# Patient Record
Sex: FEMALE | NOT HISPANIC OR LATINO | URBAN - METROPOLITAN AREA
[De-identification: names, ages, dates, MRNs, and addresses within clinical notes are randomized per-mention and may not be internally consistent; named-entity substitution may affect disease eponyms.]

---

## 2018-06-21 ENCOUNTER — PROCEDURE (OUTPATIENT)
Dept: URBAN - METROPOLITAN AREA CLINIC 45 | Facility: CLINIC | Age: 73
End: 2018-06-21
Payer: COMMERCIAL

## 2018-06-21 PROCEDURE — 65855 TRABECULOPLASTY LASER SURG: CPT | Performed by: OPHTHALMOLOGY

## 2018-06-21 ASSESSMENT — INTRAOCULAR PRESSURE
OS: 15
OS: 17
OD: 16
OD: 14

## 2018-06-29 ENCOUNTER — POST-OPERATIVE VISIT (OUTPATIENT)
Dept: URBAN - METROPOLITAN AREA CLINIC 45 | Facility: CLINIC | Age: 73
End: 2018-06-29

## 2018-06-29 PROCEDURE — 99024 POSTOP FOLLOW-UP VISIT: CPT | Performed by: OPTOMETRIST

## 2018-06-29 RX ORDER — LATANOPROST 50 UG/ML
0.005 % SOLUTION OPHTHALMIC
Qty: 1 | Refills: 5 | Status: INACTIVE
Start: 2018-06-29 | End: 2018-07-18

## 2018-06-29 RX ORDER — BRIMONIDINE TARTRATE 2 MG/ML
0.2 % SOLUTION/ DROPS OPHTHALMIC
Qty: 1 | Refills: 6 | Status: INACTIVE
Start: 2018-06-29 | End: 2018-07-18

## 2018-06-29 ASSESSMENT — INTRAOCULAR PRESSURE
OS: 15
OD: 15

## 2018-07-18 ENCOUNTER — OFFICE VISIT (OUTPATIENT)
Dept: URBAN - METROPOLITAN AREA CLINIC 45 | Facility: CLINIC | Age: 73
End: 2018-07-18
Payer: COMMERCIAL

## 2018-07-18 PROCEDURE — 92012 INTRM OPH EXAM EST PATIENT: CPT | Performed by: OPTOMETRIST

## 2018-07-18 RX ORDER — LATANOPROST 50 UG/ML
0.005 % SOLUTION OPHTHALMIC
Qty: 1 | Refills: 5 | Status: INACTIVE
Start: 2018-07-18 | End: 2018-12-11

## 2018-07-18 RX ORDER — BRIMONIDINE TARTRATE 2 MG/ML
0.2 % SOLUTION/ DROPS OPHTHALMIC
Qty: 1 | Refills: 6 | Status: INACTIVE
Start: 2018-07-18 | End: 2018-12-11

## 2018-07-18 ASSESSMENT — INTRAOCULAR PRESSURE
OD: 12
OS: 14

## 2018-07-18 NOTE — IMPRESSION/PLAN
Impression: Age-related nuclear cataract, right eye: H25.11. Plan: Will continue to observe condition and or symptoms.

## 2018-07-18 NOTE — IMPRESSION/PLAN
Impression: Primary open-angle glaucoma, bilateral, severe stage: W84.1997. Plan: Continue with Brimonidine bid ou and latanoprost qhs ou.

## 2018-08-16 ENCOUNTER — OFFICE VISIT (OUTPATIENT)
Dept: URBAN - METROPOLITAN AREA CLINIC 45 | Facility: CLINIC | Age: 73
End: 2018-08-16
Payer: COMMERCIAL

## 2018-08-16 DIAGNOSIS — H40.1133 PRIMARY OPEN-ANGLE GLAUCOMA, BILATERAL, SEVERE STAGE: ICD-10-CM

## 2018-08-16 DIAGNOSIS — H26.492 OTHER SECONDARY CATARACT, LEFT EYE: ICD-10-CM

## 2018-08-16 PROCEDURE — 99214 OFFICE O/P EST MOD 30 MIN: CPT | Performed by: OPHTHALMOLOGY

## 2018-08-16 ASSESSMENT — VISUAL ACUITY
OS: 20/150
OD: 20/40

## 2018-08-16 ASSESSMENT — INTRAOCULAR PRESSURE
OD: 13
OS: 14

## 2018-08-16 NOTE — IMPRESSION/PLAN
Impression: Primary open-angle glaucoma, bilateral, severe stage: P40.8191. Plan: Continue with Brimonidine bid ou and latanoprost qhs ou.

## 2018-08-16 NOTE — IMPRESSION/PLAN
Impression: Other secondary cataract, left eye: H26.492. Plan: Discussed risk/benefit/alternative to yag-capsulotomy, including risk of IOL dislocation and retinal detachment, pt wishes to proceed. ok for yag-cap in [place] [address] 25 RL1. BCVA could be limited due to retinal history.

## 2018-08-16 NOTE — IMPRESSION/PLAN
Impression: Age-related nuclear cataract, right eye: H25.11. Plan: Cataracts account for the patient's complaints. No treatment currently recommended. The patient will monitor vision changes and contact us with any decrease in vision.

## 2018-08-27 ENCOUNTER — SURGERY (OUTPATIENT)
Dept: URBAN - METROPOLITAN AREA SURGERY 20 | Facility: SURGERY | Age: 73
End: 2018-08-27
Payer: COMMERCIAL

## 2018-08-27 PROCEDURE — 66821 AFTER CATARACT LASER SURGERY: CPT | Performed by: OPHTHALMOLOGY

## 2018-09-04 ENCOUNTER — POST-OPERATIVE VISIT (OUTPATIENT)
Dept: URBAN - METROPOLITAN AREA CLINIC 45 | Facility: CLINIC | Age: 73
End: 2018-09-04

## 2018-09-04 PROCEDURE — 99024 POSTOP FOLLOW-UP VISIT: CPT | Performed by: OPTOMETRIST

## 2018-09-04 RX ORDER — PREDNISOLONE ACETATE 10 MG/ML
1 % SUSPENSION/ DROPS OPHTHALMIC
Qty: 1 | Refills: 0 | Status: INACTIVE
Start: 2018-09-04 | End: 2018-09-11

## 2018-09-04 ASSESSMENT — INTRAOCULAR PRESSURE
OS: 14
OD: 12

## 2018-09-11 ENCOUNTER — POST-OPERATIVE VISIT (OUTPATIENT)
Dept: URBAN - METROPOLITAN AREA CLINIC 45 | Facility: CLINIC | Age: 73
End: 2018-09-11

## 2018-09-11 PROCEDURE — 99024 POSTOP FOLLOW-UP VISIT: CPT | Performed by: OPTOMETRIST

## 2018-09-11 RX ORDER — PREDNISOLONE ACETATE 10 MG/ML
1 % SUSPENSION/ DROPS OPHTHALMIC
Qty: 1 | Refills: 1 | Status: INACTIVE
Start: 2018-09-11 | End: 2018-09-18

## 2018-09-11 ASSESSMENT — INTRAOCULAR PRESSURE
OD: 11
OS: 14

## 2018-12-11 ENCOUNTER — OFFICE VISIT (OUTPATIENT)
Dept: URBAN - METROPOLITAN AREA CLINIC 45 | Facility: CLINIC | Age: 73
End: 2018-12-11
Payer: COMMERCIAL

## 2018-12-11 DIAGNOSIS — H25.11 AGE-RELATED NUCLEAR CATARACT, RIGHT EYE: ICD-10-CM

## 2018-12-11 PROCEDURE — 99213 OFFICE O/P EST LOW 20 MIN: CPT | Performed by: OPTOMETRIST

## 2018-12-11 RX ORDER — PREDNISOLONE ACETATE 10 MG/ML
1 % SUSPENSION/ DROPS OPHTHALMIC
Qty: 1 | Refills: 0 | Status: INACTIVE
Start: 2018-12-11 | End: 2019-01-09

## 2018-12-11 RX ORDER — LATANOPROST 50 UG/ML
0.005 % SOLUTION OPHTHALMIC
Qty: 1 | Refills: 5 | Status: INACTIVE
Start: 2018-12-11 | End: 2019-04-15

## 2018-12-11 RX ORDER — BRIMONIDINE TARTRATE 2 MG/ML
0.2 % SOLUTION/ DROPS OPHTHALMIC
Qty: 1 | Refills: 6 | Status: INACTIVE
Start: 2018-12-11 | End: 2019-04-15

## 2018-12-11 ASSESSMENT — INTRAOCULAR PRESSURE
OS: 14
OD: 11

## 2018-12-11 NOTE — IMPRESSION/PLAN
Impression: Primary iridocyclitis, left eye: H20.012.  Plan: PRED ACETATE QID OS X 1 WEEK THEN TID X 1 WEEK THEN BID X 1 WEEK  THEN Q DY X 1 WK

## 2018-12-11 NOTE — IMPRESSION/PLAN
Impression: Primary open-angle glaucoma, bilateral, severe stage: G18.2358.  Plan: LATANOPROST Q HS OU, BRIMONIDINE BID OU

## 2019-04-15 ENCOUNTER — OFFICE VISIT (OUTPATIENT)
Dept: URBAN - METROPOLITAN AREA CLINIC 45 | Facility: CLINIC | Age: 74
End: 2019-04-15
Payer: COMMERCIAL

## 2019-04-15 DIAGNOSIS — H04.123 DRY EYE SYNDROME OF BILATERAL LACRIMAL GLANDS: ICD-10-CM

## 2019-04-15 PROCEDURE — 92083 EXTENDED VISUAL FIELD XM: CPT | Performed by: OPTOMETRIST

## 2019-04-15 PROCEDURE — 99213 OFFICE O/P EST LOW 20 MIN: CPT | Performed by: OPTOMETRIST

## 2019-04-15 RX ORDER — DUREZOL 0.5 MG/ML
0.05 % EMULSION OPHTHALMIC
Qty: 1 | Refills: 0 | Status: INACTIVE
Start: 2019-04-15 | End: 2019-04-30

## 2019-04-15 RX ORDER — BRIMONIDINE TARTRATE 2 MG/ML
0.2 % SOLUTION/ DROPS OPHTHALMIC
Qty: 1 | Refills: 6 | Status: INACTIVE
Start: 2019-04-15 | End: 2019-06-07

## 2019-04-15 RX ORDER — LATANOPROST 50 UG/ML
0.005 % SOLUTION OPHTHALMIC
Qty: 1 | Refills: 5 | Status: INACTIVE
Start: 2019-04-15 | End: 2019-06-07

## 2019-04-15 ASSESSMENT — INTRAOCULAR PRESSURE
OS: 14
OD: 12

## 2019-04-15 NOTE — IMPRESSION/PLAN
Impression: Primary open-angle glaucoma, bilateral, severe stage: P32.9976. Plan: VF done today, slightly worse.  Pt to continue brimonidine bid ou and Latanoprost qhs ou

## 2019-04-15 NOTE — IMPRESSION/PLAN
Impression: Primary iridocyclitis, left eye: H20.012. Plan: Pt to start Durezol 1 gtt qid os. Educated patient on risks of non-compliance, side effects, benefits and anticipated results, pt understands.

## 2019-04-30 ENCOUNTER — OFFICE VISIT (OUTPATIENT)
Dept: URBAN - METROPOLITAN AREA CLINIC 45 | Facility: CLINIC | Age: 74
End: 2019-04-30
Payer: COMMERCIAL

## 2019-04-30 PROCEDURE — 99213 OFFICE O/P EST LOW 20 MIN: CPT | Performed by: OPTOMETRIST

## 2019-04-30 PROCEDURE — 92133 CPTRZD OPH DX IMG PST SGM ON: CPT | Performed by: OPTOMETRIST

## 2019-04-30 RX ORDER — PREDNISOLONE ACETATE 10 MG/ML
1 % SUSPENSION/ DROPS OPHTHALMIC
Qty: 1 | Refills: 1 | Status: INACTIVE
Start: 2019-04-30 | End: 2019-05-29

## 2019-04-30 ASSESSMENT — INTRAOCULAR PRESSURE
OS: 12
OD: 11

## 2019-04-30 NOTE — IMPRESSION/PLAN
Impression: Primary iridocyclitis, left eye: H20.012. Plan: pt to start pred-acetate 1 gtt qid os. Educated patient on risks of non-compliance, side effects, benefits and anticipated results, pt understands.

## 2019-04-30 NOTE — IMPRESSION/PLAN
Impression: Primary open-angle glaucoma, bilateral, severe stage: C96.7157.  Plan: brimonidine bid ou and latanoprost qhs ou

## 2019-06-07 ENCOUNTER — OFFICE VISIT (OUTPATIENT)
Dept: URBAN - METROPOLITAN AREA CLINIC 45 | Facility: CLINIC | Age: 74
End: 2019-06-07
Payer: COMMERCIAL

## 2019-06-07 DIAGNOSIS — H01.8 OTHER SPECIFIED INFLAMMATION OF EYELID: ICD-10-CM

## 2019-06-07 PROCEDURE — 99213 OFFICE O/P EST LOW 20 MIN: CPT | Performed by: OPTOMETRIST

## 2019-06-07 RX ORDER — BRIMONIDINE TARTRATE 2 MG/ML
0.2 % SOLUTION/ DROPS OPHTHALMIC
Qty: 1 | Refills: 6 | Status: INACTIVE
Start: 2019-06-07 | End: 2019-07-19

## 2019-06-07 RX ORDER — LATANOPROST 50 UG/ML
0.005 % SOLUTION OPHTHALMIC
Qty: 1 | Refills: 5 | Status: INACTIVE
Start: 2019-06-07 | End: 2019-07-19

## 2019-06-07 ASSESSMENT — INTRAOCULAR PRESSURE
OS: 14
OD: 11

## 2019-06-07 NOTE — IMPRESSION/PLAN
Impression: Primary open-angle glaucoma, bilateral, severe stage: M67.2428.  Plan: brimonidine bid ou and latanoprost qhs ou

## 2019-06-07 NOTE — IMPRESSION/PLAN
Impression: Primary iridocyclitis, left eye: H20.012.  Plan: Taper pred-acetate 1 gtt tid x 1 wk then bid x 1 wk then qd x until the bottle is empty

## 2019-07-19 ENCOUNTER — OFFICE VISIT (OUTPATIENT)
Dept: URBAN - METROPOLITAN AREA CLINIC 45 | Facility: CLINIC | Age: 74
End: 2019-07-19
Payer: COMMERCIAL

## 2019-07-19 PROCEDURE — 99213 OFFICE O/P EST LOW 20 MIN: CPT | Performed by: OPTOMETRIST

## 2019-07-19 RX ORDER — LATANOPROST 50 UG/ML
0.005 % SOLUTION OPHTHALMIC
Qty: 7 | Refills: 8 | Status: INACTIVE
Start: 2019-07-19 | End: 2019-09-09

## 2019-07-19 RX ORDER — BRIMONIDINE TARTRATE 2 MG/ML
0.2 % SOLUTION/ DROPS OPHTHALMIC
Qty: 15 | Refills: 8 | Status: INACTIVE
Start: 2019-07-19 | End: 2019-09-09

## 2019-07-19 ASSESSMENT — INTRAOCULAR PRESSURE
OD: 11
OS: 13

## 2019-07-19 NOTE — IMPRESSION/PLAN
Impression: Primary open-angle glaucoma, bilateral, severe stage: E87.0688. Plan: [place], pt to take records.  COntinue with Brimonidine bid ou and Latanoprost qhs ou

## 2019-09-09 ENCOUNTER — OFFICE VISIT (OUTPATIENT)
Dept: URBAN - METROPOLITAN AREA CLINIC 45 | Facility: CLINIC | Age: 74
End: 2019-09-09
Payer: COMMERCIAL

## 2019-09-09 DIAGNOSIS — H25.811 COMBINED FORMS OF AGE-RELATED CATARACT, RIGHT EYE: ICD-10-CM

## 2019-09-09 DIAGNOSIS — E11.9 TYPE 2 DIABETES MELLITUS W/O COMPLICATION: Primary | ICD-10-CM

## 2019-09-09 PROCEDURE — 92014 COMPRE OPH EXAM EST PT 1/>: CPT | Performed by: OPTOMETRIST

## 2019-09-09 RX ORDER — GLYCERIN/PROPYLENE GLYCOL 0.3%-1%
DROPS OPHTHALMIC (EYE)
Qty: 1 | Refills: 10 | Status: ACTIVE
Start: 2019-09-09

## 2019-09-09 RX ORDER — BRIMONIDINE TARTRATE 2 MG/ML
0.2 % SOLUTION/ DROPS OPHTHALMIC
Qty: 15 | Refills: 8 | Status: INACTIVE
Start: 2019-09-09 | End: 2019-10-08

## 2019-09-09 RX ORDER — LATANOPROST 50 UG/ML
0.005 % SOLUTION OPHTHALMIC
Qty: 7 | Refills: 8 | Status: INACTIVE
Start: 2019-09-09 | End: 2019-10-08

## 2019-09-09 ASSESSMENT — INTRAOCULAR PRESSURE
OD: 18
OS: 20

## 2019-09-09 ASSESSMENT — KERATOMETRY
OS: 46.38
OD: 46.13

## 2019-09-09 ASSESSMENT — VISUAL ACUITY: OD: 20/70

## 2019-09-09 NOTE — IMPRESSION/PLAN
Impression: Type 2 diabetes mellitus w/o complication: K55.6. Plan: Diabetes type II: no background retinopathy, no signs of neovascularization noted. Discussed ocular and systemic benefits of blood sugar control.

## 2019-09-09 NOTE — IMPRESSION/PLAN
Impression: Combined forms of age-related cataract, right eye: H25.811. Plan: visually significant cataracts Od, schedule cataract extraction OD . Risk level 2. Discussed risk, benefits and alternatives. Discussed iol options.

## 2019-09-09 NOTE — IMPRESSION/PLAN
Impression: Primary open-angle glaucoma, bilateral, severe stage: L89.3288.  Plan: iop elevated today, increase Brimonidine tid ou, continue Latanoprost q hs ou, consult with Dr. Brett Marion

## 2019-09-09 NOTE — IMPRESSION/PLAN
Impression: Combined forms of age-related cataract, right eye: H25.811. Plan: Patient instructed to use artificial tears as needed.

## 2019-09-17 ENCOUNTER — PRE-OPERATIVE VISIT (OUTPATIENT)
Dept: URBAN - METROPOLITAN AREA CLINIC 45 | Facility: CLINIC | Age: 74
End: 2019-09-17
Payer: COMMERCIAL

## 2019-09-17 PROCEDURE — 76519 ECHO EXAM OF EYE: CPT | Performed by: OPHTHALMOLOGY

## 2019-09-17 ASSESSMENT — PACHYMETRY
OS: 22.81
OS: 4.45
OD: 2.82
OD: 22.56

## 2019-09-24 ENCOUNTER — OFFICE VISIT (OUTPATIENT)
Dept: URBAN - METROPOLITAN AREA CLINIC 45 | Facility: CLINIC | Age: 74
End: 2019-09-24
Payer: COMMERCIAL

## 2019-09-24 PROCEDURE — 92136 OPHTHALMIC BIOMETRY: CPT | Performed by: OPHTHALMOLOGY

## 2019-09-24 PROCEDURE — 99214 OFFICE O/P EST MOD 30 MIN: CPT | Performed by: OPHTHALMOLOGY

## 2019-09-24 ASSESSMENT — INTRAOCULAR PRESSURE
OS: 20
OD: 14

## 2019-09-24 NOTE — IMPRESSION/PLAN
Impression: Combined forms of age-related cataract, right eye: H25.811. Plan: OK for ce/iol OD with Omni in [place] [address] 25, RL2. Distance target. Recommend Pred/Nep combined drops, add Ofloxacin. Discussed diagnosis of cataracts. Discussed BCVA due to Retina and glaucoma. Cataracts are limiting vision. Discussed risks, benefits and alternatives to surgery including but not limited to: bleeding, infection, risk of vision loss, loss of the eye, need for other surgery. Patient voiced understanding and wishes to proceed.

## 2019-09-26 RX ORDER — OFLOXACIN 3 MG/ML
0.3 % SOLUTION/ DROPS OPHTHALMIC
Qty: 5 | Refills: 1 | Status: INACTIVE
Start: 2019-09-26 | End: 2019-10-08

## 2019-09-26 NOTE — IMPRESSION/PLAN
Impression: Primary open-angle glaucoma, bilateral, severe stage: A90.1523. /601, 4/19 OCT 73[birth year] 9/10 Plan: Discussed diagnosis with patient. Discussed treatment options. Recommend patient continue Latanoprost QHS OU and Brimonidine TID OU. Recommend Omni OD (if unable due to insurance coverage, second choice iStent). Discussed glaucoma and cataracts. Discussed risks, benefits and alternatives to MIGS procedure. Pt voiced understanding and desires to proceed.

## 2019-10-01 ENCOUNTER — SURGERY (OUTPATIENT)
Dept: URBAN - METROPOLITAN AREA SURGERY 20 | Facility: SURGERY | Age: 74
End: 2019-10-01
Payer: COMMERCIAL

## 2019-10-01 PROCEDURE — 66174 TRLUML DIL AQ O/F CAN W/O ST: CPT | Performed by: OPHTHALMOLOGY

## 2019-10-02 ENCOUNTER — POST-OPERATIVE VISIT (OUTPATIENT)
Dept: URBAN - METROPOLITAN AREA CLINIC 45 | Facility: CLINIC | Age: 74
End: 2019-10-02

## 2019-10-02 PROCEDURE — 99024 POSTOP FOLLOW-UP VISIT: CPT | Performed by: OPTOMETRIST

## 2019-10-02 ASSESSMENT — INTRAOCULAR PRESSURE
OS: 20
OD: 17

## 2019-10-08 ENCOUNTER — POST-OPERATIVE VISIT (OUTPATIENT)
Dept: URBAN - METROPOLITAN AREA CLINIC 45 | Facility: CLINIC | Age: 74
End: 2019-10-08

## 2019-10-08 PROCEDURE — 99024 POSTOP FOLLOW-UP VISIT: CPT | Performed by: OPTOMETRIST

## 2019-10-08 RX ORDER — LATANOPROST 50 UG/ML
0.005 % SOLUTION OPHTHALMIC
Qty: 7 | Refills: 8 | Status: INACTIVE
Start: 2019-10-08 | End: 2019-10-29

## 2019-10-08 RX ORDER — BRIMONIDINE TARTRATE 2 MG/ML
0.2 % SOLUTION/ DROPS OPHTHALMIC
Qty: 15 | Refills: 8 | Status: INACTIVE
Start: 2019-10-08 | End: 2019-10-29

## 2019-10-08 ASSESSMENT — INTRAOCULAR PRESSURE
OD: 16
OS: 19

## 2019-10-17 ENCOUNTER — POST-OPERATIVE VISIT (OUTPATIENT)
Dept: URBAN - METROPOLITAN AREA CLINIC 45 | Facility: CLINIC | Age: 74
End: 2019-10-17

## 2019-10-17 ASSESSMENT — INTRAOCULAR PRESSURE
OD: 14
OS: 14

## 2019-10-17 ASSESSMENT — VISUAL ACUITY
OS: 20/30
OD: 20/30

## 2019-10-29 ENCOUNTER — POST-OPERATIVE VISIT (OUTPATIENT)
Dept: URBAN - METROPOLITAN AREA CLINIC 45 | Facility: CLINIC | Age: 74
End: 2019-10-29

## 2019-10-29 DIAGNOSIS — H52.4 PRESBYOPIA: ICD-10-CM

## 2019-10-29 DIAGNOSIS — Z09 ENCNTR FOR F/U EXAM AFT TRTMT FOR COND OTH THAN MALIG NEOPLM: Primary | ICD-10-CM

## 2019-10-29 PROCEDURE — 99024 POSTOP FOLLOW-UP VISIT: CPT | Performed by: OPTOMETRIST

## 2019-10-29 RX ORDER — BRIMONIDINE TARTRATE 2 MG/ML
0.2 % SOLUTION/ DROPS OPHTHALMIC
Qty: 15 | Refills: 8 | Status: INACTIVE
Start: 2019-10-29 | End: 2019-12-09

## 2019-10-29 RX ORDER — LATANOPROST 50 UG/ML
0.005 % SOLUTION OPHTHALMIC
Qty: 7 | Refills: 8 | Status: INACTIVE
Start: 2019-10-29 | End: 2019-12-09

## 2019-10-29 ASSESSMENT — VISUAL ACUITY
OS: 20/20
OD: 20/25

## 2019-10-29 ASSESSMENT — INTRAOCULAR PRESSURE
OD: 15
OS: 15

## 2019-11-08 ENCOUNTER — OFFICE VISIT (OUTPATIENT)
Dept: URBAN - METROPOLITAN AREA CLINIC 45 | Facility: CLINIC | Age: 74
End: 2019-11-08
Payer: COMMERCIAL

## 2019-11-08 PROCEDURE — 92012 INTRM OPH EXAM EST PATIENT: CPT | Performed by: OPTOMETRIST

## 2019-11-08 RX ORDER — PREDNISOLONE ACETATE 10 MG/ML
1 % SUSPENSION/ DROPS OPHTHALMIC
Qty: 1 | Refills: 0 | Status: INACTIVE
Start: 2019-11-08 | End: 2019-12-07

## 2019-11-08 ASSESSMENT — INTRAOCULAR PRESSURE
OD: 9
OS: 10

## 2019-11-08 NOTE — IMPRESSION/PLAN
Impression: Primary open-angle glaucoma, bilateral, severe stage: D20.8912. Plan: Continue brimonidine 1 gtt TID OU, Latanoprost 1 gtt QHS OU.

## 2019-11-08 NOTE — IMPRESSION/PLAN
Impression: Primary iridocyclitis, left eye: H20.012. Plan: Pred-acetate 1 gtt qid, os. warm compress. keep appointment scheduled.

## 2019-12-09 ENCOUNTER — OFFICE VISIT (OUTPATIENT)
Dept: URBAN - METROPOLITAN AREA CLINIC 45 | Facility: CLINIC | Age: 74
End: 2019-12-09
Payer: COMMERCIAL

## 2019-12-09 DIAGNOSIS — H20.012 PRIMARY IRIDOCYCLITIS, LEFT EYE: ICD-10-CM

## 2019-12-09 PROCEDURE — 99213 OFFICE O/P EST LOW 20 MIN: CPT | Performed by: OPTOMETRIST

## 2019-12-09 RX ORDER — BRIMONIDINE TARTRATE 2 MG/ML
0.2 % SOLUTION/ DROPS OPHTHALMIC
Qty: 15 | Refills: 8 | Status: INACTIVE
Start: 2019-12-09 | End: 2020-12-14

## 2019-12-09 RX ORDER — PREDNISOLONE ACETATE 10 MG/ML
1 % SUSPENSION/ DROPS OPHTHALMIC
Qty: 1 | Refills: 0 | Status: ACTIVE
Start: 2019-12-09

## 2019-12-09 RX ORDER — LATANOPROST 50 UG/ML
0.005 % SOLUTION OPHTHALMIC
Qty: 7 | Refills: 8 | Status: ACTIVE
Start: 2019-12-09

## 2019-12-09 ASSESSMENT — INTRAOCULAR PRESSURE
OD: 6
OS: 7

## 2019-12-09 NOTE — IMPRESSION/PLAN
Impression: Dry eye syndrome of bilateral lacrimal glands: H04.123. Plan: Patient instructed to use artificial tears as needed.
Impression: Other specified inflammation of eyelid: H01.8. Plan: Patient instructed to apply warm compresses. Lid scrubs and hygiene were explained.
Impression: Primary iridocyclitis, left eye: H20.012.  Plan: pred acetate qid x 3 days then tid x 3 days then bid x 3 days then q dy x 3 dys then dc
Impression: Primary open-angle glaucoma, bilateral, severe stage: M02.6632.  Plan: brimonidine tid ou, latanoprost q hs ou
Yes